# Patient Record
Sex: MALE | Race: WHITE | HISPANIC OR LATINO | URBAN - METROPOLITAN AREA
[De-identification: names, ages, dates, MRNs, and addresses within clinical notes are randomized per-mention and may not be internally consistent; named-entity substitution may affect disease eponyms.]

---

## 2019-07-05 ENCOUNTER — APPOINTMENT (RX ONLY)
Dept: URBAN - METROPOLITAN AREA CLINIC 11 | Facility: CLINIC | Age: 44
Setting detail: DERMATOLOGY
End: 2019-07-05

## 2019-07-05 DIAGNOSIS — S62.6 FRACTURE OF OTHER AND UNSPECIFIED FINGER(S): ICD-10-CM

## 2019-07-05 PROBLEM — S62.649A: Status: ACTIVE | Noted: 2019-07-05

## 2019-07-05 PROCEDURE — ? PATIENT SPECIFIC COUNSELING

## 2019-07-05 PROCEDURE — ?

## 2019-07-05 PROCEDURE — 99242 OFF/OP CONSLTJ NEW/EST SF 20: CPT

## 2019-07-05 ASSESSMENT — LOCATION ZONE DERM: LOCATION ZONE: FINGER

## 2019-07-05 ASSESSMENT — PAIN INTENSITY VAS: HOW INTENSE IS YOUR PAIN 0 BEING NO PAIN, 10 BEING THE MOST SEVERE PAIN POSSIBLE?: 6/10 PAIN

## 2019-07-05 NOTE — PROCEDURE: PATIENT SPECIFIC COUNSELING
Other (Free Text): Patient to continue taking cephalexin as directed until finished.  Patient to wash with soap and water daily and wear thumb spica splint until next follow up appt in 3 weeks.  We will repeat x-ray in 1 month. Patient to call office with any signs or symptoms of infection.
Detail Level: Detailed

## 2019-07-05 NOTE — PROCEDURE: FRACTURE CARE - FINGER
Initial Treatment Received By: [facility]
Orthosis Has Been Worn For: several days
Supplies: autosetting
Other Initial Treatment Received By: [facility] Rosangela Kovacs
Render Post-Care Instructions In Note?: yes
Post-Care Instructions: I reviewed with the patient in detail post-care instructions. The patient should keep the immobilized thumb elevated to reduce swelling.  The orthosis should be kept clean and dry and protected when bathing. Should there be an increase in pain at the fracture site, massive swelling of digits, or decreased sensation, please go to the emergency room for further evaluation.
Type: finger cast

## 2019-07-05 NOTE — HPI: FINGERTIP INJURY
Is The Injury New Or Recurrent?: new
How Is Your Wound Healing?: fresh
Is This A New Presentation, Or A Follow-Up?: Fingertip Injury
Date Of Injury: 7/3/19
Additional History: Patient was seen at [facility].  He was prescribed cephalexin 500 mg TID x 7 days, ibuprofen 800 mg PRN for pain and percocet 5/325 mg tabs PRN for severe pain

## 2019-07-29 ENCOUNTER — APPOINTMENT (RX ONLY)
Dept: URBAN - METROPOLITAN AREA CLINIC 11 | Facility: CLINIC | Age: 44
Setting detail: DERMATOLOGY
End: 2019-07-29

## 2019-07-29 DIAGNOSIS — S62.6 FRACTURE OF OTHER AND UNSPECIFIED FINGER(S): ICD-10-CM

## 2019-07-29 PROBLEM — S62.649D: Status: ACTIVE | Noted: 2019-07-29

## 2019-07-29 PROCEDURE — ? COUNSELING - THUMB FX

## 2019-07-29 PROCEDURE — ? PATIENT SPECIFIC COUNSELING

## 2019-07-29 PROCEDURE — 99213 OFFICE O/P EST LOW 20 MIN: CPT

## 2019-07-29 ASSESSMENT — LOCATION DETAILED DESCRIPTION DERM: LOCATION DETAILED: RIGHT THUMB DISTAL PHALANX

## 2019-07-29 ASSESSMENT — PAIN INTENSITY VAS: HOW INTENSE IS YOUR PAIN 0 BEING NO PAIN, 10 BEING THE MOST SEVERE PAIN POSSIBLE?: NO PAIN

## 2019-07-29 ASSESSMENT — LOCATION ZONE DERM: LOCATION ZONE: FINGER

## 2019-07-29 ASSESSMENT — LOCATION SIMPLE DESCRIPTION DERM: LOCATION SIMPLE: RIGHT THUMB PHALANX

## 2019-07-29 NOTE — PROCEDURE: PATIENT SPECIFIC COUNSELING
Other (Free Text): Exam within normal limits. Patient has no restrictions or limitations at this time. Patient to call office with any signs or symptoms of infection
Detail Level: Simple
Detail Level: Detailed